# Patient Record
Sex: FEMALE | Race: WHITE
[De-identification: names, ages, dates, MRNs, and addresses within clinical notes are randomized per-mention and may not be internally consistent; named-entity substitution may affect disease eponyms.]

---

## 2020-07-21 ENCOUNTER — APPOINTMENT (OUTPATIENT)
Dept: DERMATOLOGY | Facility: CLINIC | Age: 45
End: 2020-07-21

## 2023-09-05 ENCOUNTER — NON-APPOINTMENT (OUTPATIENT)
Age: 48
End: 2023-09-05

## 2023-09-18 ENCOUNTER — EMERGENCY (EMERGENCY)
Facility: HOSPITAL | Age: 48
LOS: 1 days | Discharge: HOME | End: 2023-09-18
Attending: EMERGENCY MEDICINE | Admitting: EMERGENCY MEDICINE
Payer: COMMERCIAL

## 2023-09-18 VITALS
HEART RATE: 84 BPM | TEMPERATURE: 97 F | WEIGHT: 160.06 LBS | DIASTOLIC BLOOD PRESSURE: 75 MMHG | OXYGEN SATURATION: 100 % | SYSTOLIC BLOOD PRESSURE: 135 MMHG | RESPIRATION RATE: 16 BRPM

## 2023-09-18 PROCEDURE — 72125 CT NECK SPINE W/O DYE: CPT | Mod: 26,MA

## 2023-09-18 PROCEDURE — 99284 EMERGENCY DEPT VISIT MOD MDM: CPT

## 2023-09-18 PROCEDURE — 99284 EMERGENCY DEPT VISIT MOD MDM: CPT | Mod: 25

## 2023-09-18 PROCEDURE — 70450 CT HEAD/BRAIN W/O DYE: CPT | Mod: 26,MA

## 2023-09-18 PROCEDURE — 72125 CT NECK SPINE W/O DYE: CPT | Mod: MA

## 2023-09-18 PROCEDURE — 70450 CT HEAD/BRAIN W/O DYE: CPT | Mod: MA

## 2023-09-18 NOTE — ED PROVIDER NOTE - CLINICAL SUMMARY MEDICAL DECISION MAKING FREE TEXT BOX
Glucose 135   Patient referred  by urgent care for CT due to waxing and waning head and neck pain s/p mechanical fall on stairs at home. Patient was seen at urgent care 1 week ago, had neg xrays of her knee and wrist, but returned today for continued symptoms .  No loc dizziness nausea vomiting focal weakness or numbness. Patient declined pain meds    Plan CT head and c spine

## 2023-09-18 NOTE — ED ADULT TRIAGE NOTE - BP NONINVASIVE DIASTOLIC (MM HG)
[address] 
[address] [facility] DEPT 
[address] [place], [place] [address] [id number] 
[phone] Work/School Note Date: 3/15/2018 To Whom It May concern: 
 
Aditi Gates was seen and treated today in the emergency room by the following provider(s): 
Attending Provider: Jerzy Sommer MD 
Nurse Practitioner: Eva Cottrell NP. Aditi Gates may return to gym class or sports on 3/22/18.  
 
Sincerely, 
 
 
 
 
Eva Cottrell NP 
 
 
 
 75

## 2023-09-18 NOTE — ED PROVIDER NOTE - NSFOLLOWUPINSTRUCTIONS_ED_ALL_ED_FT
1) Follow up with your neurologist / concussion clinic in 1-2 days.  2) Return to the ED immediately for new or worsening symptoms as we discussed.      English    Concussion, Adult  Three rear views of the head showing how quick, sudden head movements injure the brain.  A concussion is a brain injury from a hard, direct hit (trauma) to the head or body. This direct hit causes the brain to shake quickly back and forth inside the skull. This can damage brain cells and cause chemical changes in the brain. A concussion may also be known as a mild traumatic brain injury (TBI).    The effects of a concussion can be serious. If you have a concussion, you should be very careful to avoid having a second concussion.    What are the causes?  This condition is caused by:  A direct hit to your head.  Sudden movement of your body that causes your brain to move back and forth inside the skull, such as in a car crash.  What are the signs or symptoms?  The signs of a concussion can be hard to notice. Early on, they may be missed by you, family members, and health care providers. You may look fine on the outside but may act or feel differently.    Every head injury is different. Symptoms are usually temporary but may last for days, weeks, or even months. Some symptoms appear right away, but other symptoms may not show up for hours or days.    Physical symptoms    Headaches.  Dizziness and problems with coordination or balance.  Sensitivity to light or noise.  Nausea or vomiting.  Tiredness (fatigue).  Vision or hearing problems.  Seizure.  Mental and emotional symptoms    Irritability or mood changes.  Memory problems.  Trouble concentrating, organizing, or making decisions.  Changes in eating or sleeping patterns.  Slowness in thinking, acting or reacting, speaking, or reading.  Anxiety or depression.  How is this diagnosed?  This condition is diagnosed based on your symptoms and injury.    You may also have tests, including:  Imaging tests, such as a CT scan or an MRI.  Neuropsychological tests. These measure your thinking, understanding, learning, and memory.  How is this treated?  Treatment for this condition includes:  Stopping sports or activity if you are injured.  Physical and mental rest and careful observation, usually at home.  Medicines to help with symptoms such as headaches, nausea, or difficulty sleeping.  Referral to a concussion clinic or rehab center.  Follow these instructions at home:  Activity    Limit activities that require a lot of thought or concentration, such as:  Doing homework or job-related work.  Watching TV.  Using the computer or phone.  Playing memory games and doing puzzles.  Rest helps your brain heal. Make sure you:  Get plenty of sleep. Most adults should get 7–9 hours of sleep each night.  Rest during the day. Take naps or rest breaks when you feel tired.  Avoid high-intensity exercise or physical activities that take a lot of effort. Stop any activity that worsens symptoms. Your health care provider may recommend light exercise such as walking.  Do not do high-risk activities that could cause a second concussion, such as riding a bike or playing sports.  Ask your health care provider when you can return to your normal activities, such as school, work, sports, and driving. Your ability to react may be slower after a brain injury. Never do these activities if you are dizzy.  General instructions    A bottle of beer, a glass of wine, and a glass of hard liquor with a "do not drink" sign over them.   Take over-the-counter and prescription medicines only as told by your health care provider. Some medicines, such as blood thinners (anticoagulants) and aspirin, may increase the risk for complications, such as bleeding.  Avoid taking opioid pain medicine while recovering from a concussion.  Do not drink alcohol until your health care provider says you can. Drinking alcohol may slow your recovery and can put you at risk of further injury.  Watch your symptoms and tell others around you to do the same. Complications sometimes occur after a concussion.  Tell your , teachers, school nurse, school counselor, , or  about your injury, symptoms, and restrictions.  See a mental health therapist if you feel anxious or depressed. Managing this condition can be challenging.  Keep all follow-up visits. Your health care provider will check on your recovery and give you a plan for returning to activities.  How is this prevented?  Avoiding another brain injury is very important. In rare cases, another injury can lead to permanent brain damage, brain swelling, or death. The risk of this is greatest during the first 7–10 days after a head injury. Avoid injuries by:  Stopping activities that could lead to a second concussion, such as contact or recreational sports, until your health care provider says it is okay.  Taking these actions once you have returned to sports or activities:  Avoid plays or moves that can cause you to crash into another person. This is how most concussions occur.  Follow the rules and be respectful of other players. Do not engage in violent or illegal plays.  Getting regular exercise that includes strength and balance training.  Wearing a properly fitting helmet during sports, biking, or other activities. Helmets can help protect you from serious skull and brain injuries, but they may not protect you from a concussion. Even when wearing a helmet, you should avoid being hit in the head.  Where to find more information  Centers for Disease Control and Prevention: cdc.gov  Contact a health care provider if:  Your symptoms do not improve or get worse.  You have new symptoms.  You have another injury.  Your coordination gets worse.  You have unusual behavior changes.  Get help right away if:  You have a severe or worsening headache.  You have weakness or numbness in any part of your body, slurred speech, vision changes, or confusion.  You vomit repeatedly.  You lose consciousness, are sleepier than normal, or are difficult to wake up.  You have a seizure.  These symptoms may be an emergency. Get help right away. Call 911.  Do not wait to see if the symptoms will go away.  Do not drive yourself to the hospital.  Also, get help right away if:  You have thoughts of hurting yourself or others.  Take one of these steps if you feel like you may hurt yourself or others, or have thoughts about taking your own life:  Go to your nearest emergency room.  Call 911.  Call the National Suicide Prevention Lifeline at 1-410.451.5958 or 708. This is open 24 hours a day.  Text the Crisis Text Line at 323748.  This information is not intended to replace advice given to you by your health care provider. Make sure you discuss any questions you have with your health care provider.    Document Revised: 05/12/2023 Document Reviewed: 05/12/2023  Elsevier Patient Education © 2023 Elsevier Inc. 1) Follow up with your neurologist / concussion clinic in 1-2 days.  2) Return to the ED immediately for new or worsening symptoms as we discussed.      English    Concussion, Adult  Three rear views of the head showing how quick, sudden head movements injure the brain.  A concussion is a brain injury from a hard, direct hit (trauma) to the head or body. This direct hit causes the brain to shake quickly back and forth inside the skull. This can damage brain cells and cause chemical changes in the brain. A concussion may also be known as a mild traumatic brain injury (TBI).    The effects of a concussion can be serious. If you have a concussion, you should be very careful to avoid having a second concussion.    What are the causes?  This condition is caused by:  A direct hit to your head.  Sudden movement of your body that causes your brain to move back and forth inside the skull, such as in a car crash.  What are the signs or symptoms?  The signs of a concussion can be hard to notice. Early on, they may be missed by you, family members, and health care providers. You may look fine on the outside but may act or feel differently.    Every head injury is different. Symptoms are usually temporary but may last for days, weeks, or even months. Some symptoms appear right away, but other symptoms may not show up for hours or days.    Physical symptoms    Headaches.  Dizziness and problems with coordination or balance.  Sensitivity to light or noise.  Nausea or vomiting.  Tiredness (fatigue).  Vision or hearing problems.  Seizure.  Mental and emotional symptoms    Irritability or mood changes.  Memory problems.  Trouble concentrating, organizing, or making decisions.  Changes in eating or sleeping patterns.  Slowness in thinking, acting or reacting, speaking, or reading.  Anxiety or depression.  How is this diagnosed?  This condition is diagnosed based on your symptoms and injury.    You may also have tests, including:  Imaging tests, such as a CT scan or an MRI.  Neuropsychological tests. These measure your thinking, understanding, learning, and memory.  How is this treated?  Treatment for this condition includes:  Stopping sports or activity if you are injured.  Physical and mental rest and careful observation, usually at home.  Medicines to help with symptoms such as headaches, nausea, or difficulty sleeping.  Referral to a concussion clinic or rehab center.  Follow these instructions at home:  Activity    Limit activities that require a lot of thought or concentration, such as:  Doing homework or job-related work.  Watching TV.  Using the computer or phone.  Playing memory games and doing puzzles.  Rest helps your brain heal. Make sure you:  Get plenty of sleep. Most adults should get 7–9 hours of sleep each night.  Rest during the day. Take naps or rest breaks when you feel tired.  Avoid high-intensity exercise or physical activities that take a lot of effort. Stop any activity that worsens symptoms. Your health care provider may recommend light exercise such as walking.  Do not do high-risk activities that could cause a second concussion, such as riding a bike or playing sports.  Ask your health care provider when you can return to your normal activities, such as school, work, sports, and driving. Your ability to react may be slower after a brain injury. Never do these activities if you are dizzy.  General instructions    A bottle of beer, a glass of wine, and a glass of hard liquor with a "do not drink" sign over them.   Take over-the-counter and prescription medicines only as told by your health care provider. Some medicines, such as blood thinners (anticoagulants) and aspirin, may increase the risk for complications, such as bleeding.  Avoid taking opioid pain medicine while recovering from a concussion.  Do not drink alcohol until your health care provider says you can. Drinking alcohol may slow your recovery and can put you at risk of further injury.  Watch your symptoms and tell others around you to do the same. Complications sometimes occur after a concussion.  Tell your , teachers, school nurse, school counselor, , or  about your injury, symptoms, and restrictions.  See a mental health therapist if you feel anxious or depressed. Managing this condition can be challenging.  Keep all follow-up visits. Your health care provider will check on your recovery and give you a plan for returning to activities.  How is this prevented?  Avoiding another brain injury is very important. In rare cases, another injury can lead to permanent brain damage, brain swelling, or death. The risk of this is greatest during the first 7–10 days after a head injury. Avoid injuries by:  Stopping activities that could lead to a second concussion, such as contact or recreational sports, until your health care provider says it is okay.  Taking these actions once you have returned to sports or activities:  Avoid plays or moves that can cause you to crash into another person. This is how most concussions occur.  Follow the rules and be respectful of other players. Do not engage in violent or illegal plays.  Getting regular exercise that includes strength and balance training.  Wearing a properly fitting helmet during sports, biking, or other activities. Helmets can help protect you from serious skull and brain injuries, but they may not protect you from a concussion. Even when wearing a helmet, you should avoid being hit in the head.  Where to find more information  Centers for Disease Control and Prevention: cdc.gov  Contact a health care provider if:  Your symptoms do not improve or get worse.  You have new symptoms.  You have another injury.  Your coordination gets worse.  You have unusual behavior changes.  Get help right away if:  You have a severe or worsening headache.  You have weakness or numbness in any part of your body, slurred speech, vision changes, or confusion.  You vomit repeatedly.  You lose consciousness, are sleepier than normal, or are difficult to wake up.  You have a seizure.  These symptoms may be an emergency. Get help right away. Call 911.  Do not wait to see if the symptoms will go away.  Do not drive yourself to the hospital.  Also, get help right away if:  You have thoughts of hurting yourself or others.  Take one of these steps if you feel like you may hurt yourself or others, or have thoughts about taking your own life:  Go to your nearest emergency room.  Call 911.  Call the National Suicide Prevention Lifeline at 1-196.848.4232 or 570. This is open 24 hours a day.  Text the Crisis Text Line at 399095.  This information is not intended to replace advice given to you by your health care provider. Make sure you discuss any questions you have with your health care provider.    Document Revised: 05/12/2023 Document Reviewed: 05/12/2023  Elsevier Patient Education © 2023 Elsevier Inc. 1) Follow up with your neurologist / concussion clinic in 1-2 days.  2) Return to the ED immediately for new or worsening symptoms as we discussed.      English    Concussion, Adult  Three rear views of the head showing how quick, sudden head movements injure the brain.  A concussion is a brain injury from a hard, direct hit (trauma) to the head or body. This direct hit causes the brain to shake quickly back and forth inside the skull. This can damage brain cells and cause chemical changes in the brain. A concussion may also be known as a mild traumatic brain injury (TBI).    The effects of a concussion can be serious. If you have a concussion, you should be very careful to avoid having a second concussion.    What are the causes?  This condition is caused by:  A direct hit to your head.  Sudden movement of your body that causes your brain to move back and forth inside the skull, such as in a car crash.  What are the signs or symptoms?  The signs of a concussion can be hard to notice. Early on, they may be missed by you, family members, and health care providers. You may look fine on the outside but may act or feel differently.    Every head injury is different. Symptoms are usually temporary but may last for days, weeks, or even months. Some symptoms appear right away, but other symptoms may not show up for hours or days.    Physical symptoms    Headaches.  Dizziness and problems with coordination or balance.  Sensitivity to light or noise.  Nausea or vomiting.  Tiredness (fatigue).  Vision or hearing problems.  Seizure.  Mental and emotional symptoms    Irritability or mood changes.  Memory problems.  Trouble concentrating, organizing, or making decisions.  Changes in eating or sleeping patterns.  Slowness in thinking, acting or reacting, speaking, or reading.  Anxiety or depression.  How is this diagnosed?  This condition is diagnosed based on your symptoms and injury.    You may also have tests, including:  Imaging tests, such as a CT scan or an MRI.  Neuropsychological tests. These measure your thinking, understanding, learning, and memory.  How is this treated?  Treatment for this condition includes:  Stopping sports or activity if you are injured.  Physical and mental rest and careful observation, usually at home.  Medicines to help with symptoms such as headaches, nausea, or difficulty sleeping.  Referral to a concussion clinic or rehab center.  Follow these instructions at home:  Activity    Limit activities that require a lot of thought or concentration, such as:  Doing homework or job-related work.  Watching TV.  Using the computer or phone.  Playing memory games and doing puzzles.  Rest helps your brain heal. Make sure you:  Get plenty of sleep. Most adults should get 7–9 hours of sleep each night.  Rest during the day. Take naps or rest breaks when you feel tired.  Avoid high-intensity exercise or physical activities that take a lot of effort. Stop any activity that worsens symptoms. Your health care provider may recommend light exercise such as walking.  Do not do high-risk activities that could cause a second concussion, such as riding a bike or playing sports.  Ask your health care provider when you can return to your normal activities, such as school, work, sports, and driving. Your ability to react may be slower after a brain injury. Never do these activities if you are dizzy.  General instructions    A bottle of beer, a glass of wine, and a glass of hard liquor with a "do not drink" sign over them.   Take over-the-counter and prescription medicines only as told by your health care provider. Some medicines, such as blood thinners (anticoagulants) and aspirin, may increase the risk for complications, such as bleeding.  Avoid taking opioid pain medicine while recovering from a concussion.  Do not drink alcohol until your health care provider says you can. Drinking alcohol may slow your recovery and can put you at risk of further injury.  Watch your symptoms and tell others around you to do the same. Complications sometimes occur after a concussion.  Tell your , teachers, school nurse, school counselor, , or  about your injury, symptoms, and restrictions.  See a mental health therapist if you feel anxious or depressed. Managing this condition can be challenging.  Keep all follow-up visits. Your health care provider will check on your recovery and give you a plan for returning to activities.  How is this prevented?  Avoiding another brain injury is very important. In rare cases, another injury can lead to permanent brain damage, brain swelling, or death. The risk of this is greatest during the first 7–10 days after a head injury. Avoid injuries by:  Stopping activities that could lead to a second concussion, such as contact or recreational sports, until your health care provider says it is okay.  Taking these actions once you have returned to sports or activities:  Avoid plays or moves that can cause you to crash into another person. This is how most concussions occur.  Follow the rules and be respectful of other players. Do not engage in violent or illegal plays.  Getting regular exercise that includes strength and balance training.  Wearing a properly fitting helmet during sports, biking, or other activities. Helmets can help protect you from serious skull and brain injuries, but they may not protect you from a concussion. Even when wearing a helmet, you should avoid being hit in the head.  Where to find more information  Centers for Disease Control and Prevention: cdc.gov  Contact a health care provider if:  Your symptoms do not improve or get worse.  You have new symptoms.  You have another injury.  Your coordination gets worse.  You have unusual behavior changes.  Get help right away if:  You have a severe or worsening headache.  You have weakness or numbness in any part of your body, slurred speech, vision changes, or confusion.  You vomit repeatedly.  You lose consciousness, are sleepier than normal, or are difficult to wake up.  You have a seizure.  These symptoms may be an emergency. Get help right away. Call 911.  Do not wait to see if the symptoms will go away.  Do not drive yourself to the hospital.  Also, get help right away if:  You have thoughts of hurting yourself or others.  Take one of these steps if you feel like you may hurt yourself or others, or have thoughts about taking your own life:  Go to your nearest emergency room.  Call 911.  Call the National Suicide Prevention Lifeline at 1-529.689.2540 or 071. This is open 24 hours a day.  Text the Crisis Text Line at 428491.  This information is not intended to replace advice given to you by your health care provider. Make sure you discuss any questions you have with your health care provider.    Document Revised: 05/12/2023 Document Reviewed: 05/12/2023  Elsevier Patient Education © 2023 Elsevier Inc.

## 2023-09-18 NOTE — ED PROVIDER NOTE - NSFOLLOWUPCLINICS_GEN_ALL_ED_FT
Concussion Program  Concussion  .  [place]   Phone: [phone]  Fax:      Concussion Program  Concussion  .  [place]   Phone: [phone]  Fax:      Concussion Program  Concussion  .  [place]   Phone: [phone]  Fax:

## 2023-09-18 NOTE — ED PROVIDER NOTE - NS ED ATTENDING STATEMENT MOD
This was a shared visit with the DIVINA. I reviewed and verified the documentation and independently performed the documented:

## 2023-09-18 NOTE — ED PROVIDER NOTE - OBJECTIVE STATEMENT
47 yo F PMHx migraines presents to ED c/o migraine headache off and on sp head trauma 13 days ago. Pt reports mechanical slip and fall down a flight of stairs, hitting head on the steps as she slid down and then into her front door. Denies LOC. Seen at  over 1 week ago- negative XRs. Since fall pt states her typical migraines have been more frequent and intense.  sent pt in today for CT head. Pt also reports generalized neck soreness. Denies visual changes, numbness/tingling, dizziness, weakness, N/V.

## 2023-09-18 NOTE — ED ADULT TRIAGE NOTE - CHIEF COMPLAINT QUOTE
S/P fall 2 weeks ago and hit the head. C/O Migraine headache. patient was seen at urgent care today. Patient is send from urgent care for CT scan of the head.

## 2023-09-18 NOTE — ED PROVIDER NOTE - PATIENT PORTAL LINK FT
You can access the FollowMyHealth Patient Portal offered by [facility] by registering at the following website: [email or web address]. By joining [facility]’s FollowMyHealth portal, you will also be able to view your health information using other applications (apps) compatible with our system. You can access the FollowMyHealth Patient Portal offered by [facility] by registering at the following website: [email or web address]. By joining [facility]’s FollowMyHealth portal, you will also be able to view your health information using other applications (apps) compatible with our system. You can access the FollowMyHealth Patient Portal offered by [facility] by registering at the following website: [email or web address]. By joining [facility]’s FollowMyHealth portal, you will also be able to view your health information using other applications (apps) compatible with our system.

## 2023-09-18 NOTE — ED PROVIDER NOTE - PROGRESS NOTE DETAILS
pt asymptomatic at this time. NVI. Discussed the results of all diagnostic testing in ED and copies of all reports given.   Pt was given an opportunity to have all questions answered to satisfaction.  Discussed the importance of prompt, close neuro follow-up. ED return precautions discussed at length.  Pt verbalizes agreement and understanding of plan and ED return precautions. Pt well appearing, stable for DC home. No emergent concerns at this time.

## 2024-05-16 ENCOUNTER — NON-APPOINTMENT (OUTPATIENT)
Age: 49
End: 2024-05-16

## 2024-05-22 PROBLEM — G43.909 MIGRAINE, UNSPECIFIED, NOT INTRACTABLE, WITHOUT STATUS MIGRAINOSUS: Chronic | Status: ACTIVE | Noted: 2023-09-18

## 2024-05-23 ENCOUNTER — APPOINTMENT (OUTPATIENT)
Dept: ORTHOPEDIC SURGERY | Facility: CLINIC | Age: 49
End: 2024-05-23
Payer: COMMERCIAL

## 2024-05-23 VITALS — HEIGHT: 60 IN | BODY MASS INDEX: 32.39 KG/M2 | WEIGHT: 165 LBS

## 2024-05-23 DIAGNOSIS — Z78.9 OTHER SPECIFIED HEALTH STATUS: ICD-10-CM

## 2024-05-23 DIAGNOSIS — G43.909 MIGRAINE, UNSPECIFIED, NOT INTRACTABLE, W/OUT STATUS MIGRAINOSUS: ICD-10-CM

## 2024-05-23 DIAGNOSIS — S93.524A SPRAIN OF METATARSOPHALANGEAL JOINT OF RIGHT LESSER TOE(S), INITIAL ENCOUNTER: ICD-10-CM

## 2024-05-23 DIAGNOSIS — Z00.00 ENCOUNTER FOR GENERAL ADULT MEDICAL EXAMINATION W/OUT ABNORMAL FINDINGS: ICD-10-CM

## 2024-05-23 PROCEDURE — 99203 OFFICE O/P NEW LOW 30 MIN: CPT

## 2024-05-23 PROCEDURE — 73630 X-RAY EXAM OF FOOT: CPT | Mod: RT

## 2024-05-23 NOTE — PHYSICAL EXAM
[Right] : right foot and ankle [Mild] : mild swelling of dorsal foot [2nd] : 2nd [3rd] : 3rd [4th] : 4th [5th] : 5th [NL (40)] : plantar flexion 40 degrees [NL 30)] : inversion 30 degrees [NL (20)] : eversion 20 degrees [5___] : eversion 5[unfilled]/5 [2+] : dorsalis pedis pulse: 2+ [] : patient ambulates without assistive device

## 2024-05-23 NOTE — ASSESSMENT
[FreeTextEntry1] : wbat supportive shoe ice/elevate nsaids prn limit activity f/up 2-3 wks if not resolved

## 2024-05-23 NOTE — HISTORY OF PRESENT ILLNESS
[Dull/Aching] : dull/aching [Sharp] : sharp [Full time] : Work status: full time [de-identified] : 05/23/2024: struck toes 2 wks ago w/ ongoing pain. [facility] prior foreign body in foot. no prior foot surgery. denies dm/tob. --family law  [] : Post Surgical Visit: no [FreeTextEntry1] : R foot

## 2024-07-08 ENCOUNTER — APPOINTMENT (OUTPATIENT)
Dept: ORTHOPEDIC SURGERY | Facility: CLINIC | Age: 49
End: 2024-07-08

## 2024-07-08 VITALS — HEIGHT: 60 IN | WEIGHT: 165 LBS | BODY MASS INDEX: 32.39 KG/M2

## 2024-07-08 DIAGNOSIS — S93.525D: ICD-10-CM

## 2024-07-08 PROCEDURE — 99212 OFFICE O/P EST SF 10 MIN: CPT | Mod: 25

## 2024-07-08 PROCEDURE — 73630 X-RAY EXAM OF FOOT: CPT | Mod: RT

## 2024-07-18 ENCOUNTER — APPOINTMENT (OUTPATIENT)
Dept: MRI IMAGING | Facility: CLINIC | Age: 49
End: 2024-07-18

## 2024-07-18 PROCEDURE — 73718 MRI LOWER EXTREMITY W/O DYE: CPT | Mod: RT

## 2024-07-22 ENCOUNTER — APPOINTMENT (OUTPATIENT)
Dept: ORTHOPEDIC SURGERY | Facility: CLINIC | Age: 49
End: 2024-07-22

## 2024-07-22 VITALS — WEIGHT: 165 LBS | BODY MASS INDEX: 32.39 KG/M2 | HEIGHT: 60 IN

## 2024-07-22 DIAGNOSIS — M84.374A STRESS FRACTURE, RIGHT FOOT, INITIAL ENCOUNTER FOR FRACTURE: ICD-10-CM

## 2024-07-22 PROCEDURE — L4361: CPT | Mod: RT

## 2024-07-22 PROCEDURE — 99212 OFFICE O/P EST SF 10 MIN: CPT

## 2024-07-22 NOTE — PHYSICAL EXAM
[Right] : right foot and ankle [Mild] : mild swelling of dorsal foot [NL (40)] : plantar flexion 40 degrees [NL 30)] : inversion 30 degrees [NL (20)] : eversion 20 degrees [5___] : eversion 5[unfilled]/5 [2+] : dorsalis pedis pulse: 2+ [] : metatarsal tenderness [2nd] : 2nd

## 2024-07-22 NOTE — DATA REVIEWED
[MRI] : MRI [Right] : of the right [Foot] : foot [I reviewed the films/CD and additionally noted] : I reviewed the films/CD and additionally noted

## 2024-08-05 ENCOUNTER — APPOINTMENT (OUTPATIENT)
Dept: ORTHOPEDIC SURGERY | Facility: CLINIC | Age: 49
End: 2024-08-05

## 2024-08-05 PROBLEM — M84.374D STRESS REACTION OF RIGHT FOOT WITH ROUTINE HEALING, SUBSEQUENT ENCOUNTER: Status: ACTIVE | Noted: 2024-08-05

## 2024-08-05 PROCEDURE — 73630 X-RAY EXAM OF FOOT: CPT | Mod: RT

## 2024-08-05 PROCEDURE — 99213 OFFICE O/P EST LOW 20 MIN: CPT

## 2024-08-05 NOTE — DATA REVIEWED
[MRI] : MRI [Right] : of the right [Foot] : foot [I reviewed the films/CD and additionally noted] : I reviewed the films/CD and additionally noted [FreeTextEntry1] : stress rxn 2nd mt---no report available

## 2024-08-05 NOTE — PHYSICAL EXAM
[Right] : right foot and ankle [2nd] : 2nd [NL (40)] : plantar flexion 40 degrees [NL 30)] : inversion 30 degrees [NL (20)] : eversion 20 degrees [5___] : eversion 5[unfilled]/5 [2+] : dorsalis pedis pulse: 2+ [] : no pain on mid-foot stress [FreeTextEntry8] : improved

## 2024-08-05 NOTE — HISTORY OF PRESENT ILLNESS
[Dull/Aching] : dull/aching [Sharp] : sharp [Full time] : Work status: full time [de-identified] : 05/23/2024: struck toes 2 wks ago w/ ongoing pain. [facility] prior [facility] body in foot. no prior foot surgery. denies dm/tob. --family law  07/08/2024: pain more proximal in foot has persisted. no new injury. occ swelling  07/22/2024: mri f/up. no sig change  08/05/2024: improving. walking in boot [] : Post Surgical Visit: no [FreeTextEntry1] : R foot

## 2024-08-22 ENCOUNTER — APPOINTMENT (OUTPATIENT)
Dept: ORTHOPEDIC SURGERY | Facility: CLINIC | Age: 49
End: 2024-08-22
Payer: COMMERCIAL

## 2024-08-22 VITALS — WEIGHT: 170 LBS | HEIGHT: 60 IN | BODY MASS INDEX: 33.38 KG/M2

## 2024-08-22 DIAGNOSIS — M79.18 MYALGIA, OTHER SITE: ICD-10-CM

## 2024-08-22 DIAGNOSIS — M75.42 IMPINGEMENT SYNDROME OF LEFT SHOULDER: ICD-10-CM

## 2024-08-22 PROCEDURE — 99214 OFFICE O/P EST MOD 30 MIN: CPT | Mod: 25

## 2024-08-22 PROCEDURE — 99204 OFFICE O/P NEW MOD 45 MIN: CPT | Mod: 25

## 2024-08-22 PROCEDURE — 73030 X-RAY EXAM OF SHOULDER: CPT | Mod: LT

## 2024-08-22 PROCEDURE — 99213 OFFICE O/P EST LOW 20 MIN: CPT | Mod: 25

## 2024-08-22 PROCEDURE — 73010 X-RAY EXAM OF SHOULDER BLADE: CPT | Mod: LT

## 2024-08-22 RX ORDER — NAPROXEN 500 MG/1
500 TABLET ORAL TWICE DAILY
Qty: 30 | Refills: 0 | Status: ACTIVE | COMMUNITY
Start: 2024-08-22 | End: 1900-01-01

## 2024-08-22 NOTE — ASSESSMENT
[FreeTextEntry1] : Left X-Ray Examination of the SHOULDER (2 views):  No fractures, subluxations or dislocations. X-Ray Examination of the SCAPULA 1 or 2 views shows: No significant abnormalities.  Acromial spur   Shoulder impingement with exacerbation    - We discussed their diagnosis and treatment options at length including the risks and benefits of both surgical and non-surgical options. Surgical risks include but are not limited to pain, infection, bleeding, vascular injury, numbness, tingling, nerve damage. - Due to risks of surgery, they will continue conservative treatment with PT, icing, and anti-inflammatory medications - The patient was provided with a prescription to work on scapular strengthening and rotator cuff strengthening. - The patient was advised to let pain guide the gradual advancement of activities. - naprosyn rx - Patient was given a prescription for an anti-inflammatory medication.  They will take it for the next week and then on an as needed basis, as long as there are no medical contra-indications.  Patient is counseled on possible GI, renal, and cardiovascular side effects. - Follow up as needed in 6 weeks to re-evaluate progress with therapy

## 2024-08-22 NOTE — IMAGING
[de-identified] :  LEFT SHOULDER Inspection: No swelling.  Palpation: Tenderness is noted at the bicipital groove, anterior and lateral.  Range of motion: There is pain with range of motion. , ER 55, @90ER 90, @90IR 30 Strength: There is pain and discomfort with strength testing. Forward Flexion 4/5. Abduction 4/5.  External Rotation 5-/5 and Internal Rotation 5/5  Neurological testings: motor and sensor intact distally. Ligament Stability and Special Tests:  There is positive arc of pain.  Shoulder apprehension: neg Shoulder relocation: neg Obriens test: pos Biceps Active test: neg Tomlinson Labral Shear: neg Impingement testing: pos Melanie testing: pos Whipple: pos Cross Body Adduction: neg

## 2024-08-22 NOTE — IMAGING
[de-identified] :  LEFT SHOULDER Inspection: No swelling.  Palpation: Tenderness is noted at the bicipital groove, anterior and lateral.  Range of motion: There is pain with range of motion. , ER 55, @90ER 90, @90IR 30 Strength: There is pain and discomfort with strength testing. Forward Flexion 4/5. Abduction 4/5.  External Rotation 5-/5 and Internal Rotation 5/5  Neurological testings: motor and sensor intact distally. Ligament Stability and Special Tests:  There is positive arc of pain.  Shoulder apprehension: neg Shoulder relocation: neg Obriens test: pos Biceps Active test: neg Tomlinson Labral Shear: neg Impingement testing: pos Melanie testing: pos Whipple: pos Cross Body Adduction: neg

## 2024-08-22 NOTE — HISTORY OF PRESENT ILLNESS
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 Has tried ice, Aleve , activiyt mod

## 2024-08-22 NOTE — HISTORY OF PRESENT ILLNESS
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 Has tried ice, Aleve , activiyt mod